# Patient Record
Sex: FEMALE | Race: WHITE | NOT HISPANIC OR LATINO | Employment: FULL TIME | URBAN - METROPOLITAN AREA
[De-identification: names, ages, dates, MRNs, and addresses within clinical notes are randomized per-mention and may not be internally consistent; named-entity substitution may affect disease eponyms.]

---

## 2020-03-25 ENCOUNTER — OFFICE VISIT (OUTPATIENT)
Dept: FAMILY MEDICINE CLINIC | Facility: CLINIC | Age: 25
End: 2020-03-25

## 2020-03-25 VITALS
BODY MASS INDEX: 20.98 KG/M2 | HEIGHT: 62 IN | OXYGEN SATURATION: 99 % | WEIGHT: 114 LBS | HEART RATE: 110 BPM | SYSTOLIC BLOOD PRESSURE: 106 MMHG | DIASTOLIC BLOOD PRESSURE: 68 MMHG

## 2020-03-25 DIAGNOSIS — F90.2 ATTENTION DEFICIT HYPERACTIVITY DISORDER (ADHD), COMBINED TYPE: Primary | ICD-10-CM

## 2020-03-25 DIAGNOSIS — F41.1 GAD (GENERALIZED ANXIETY DISORDER): ICD-10-CM

## 2020-03-25 DIAGNOSIS — L70.0 ACNE VULGARIS: ICD-10-CM

## 2020-03-25 DIAGNOSIS — F32.0 CURRENT MILD EPISODE OF MAJOR DEPRESSIVE DISORDER WITHOUT PRIOR EPISODE (HCC): ICD-10-CM

## 2020-03-25 DIAGNOSIS — Z51.81 THERAPEUTIC DRUG MONITORING: ICD-10-CM

## 2020-03-25 PROBLEM — Z79.899 CONTROLLED SUBSTANCE AGREEMENT SIGNED: Status: ACTIVE | Noted: 2020-03-25

## 2020-03-25 PROCEDURE — 99204 OFFICE O/P NEW MOD 45 MIN: CPT | Performed by: FAMILY MEDICINE

## 2020-03-25 RX ORDER — DROSPIRENONE AND ETHINYL ESTRADIOL 0.03MG-3MG
1 KIT ORAL DAILY
COMMUNITY
Start: 2020-03-17

## 2020-03-25 RX ORDER — DEXTROAMPHETAMINE SACCHARATE, AMPHETAMINE ASPARTATE MONOHYDRATE, DEXTROAMPHETAMINE SULFATE AND AMPHETAMINE SULFATE 5; 5; 5; 5 MG/1; MG/1; MG/1; MG/1
20 CAPSULE, EXTENDED RELEASE ORAL EVERY MORNING
Qty: 30 CAPSULE | Refills: 0 | Status: SHIPPED | OUTPATIENT
Start: 2020-03-25 | End: 2020-04-27 | Stop reason: SDUPTHER

## 2020-03-25 RX ORDER — CLINDAMYCIN PHOSPHATE 10 MG/G
1 GEL TOPICAL 2 TIMES DAILY
COMMUNITY
End: 2020-10-16

## 2020-03-25 RX ORDER — DEXTROAMPHETAMINE SACCHARATE, AMPHETAMINE ASPARTATE MONOHYDRATE, DEXTROAMPHETAMINE SULFATE AND AMPHETAMINE SULFATE 5; 5; 5; 5 MG/1; MG/1; MG/1; MG/1
20 CAPSULE, EXTENDED RELEASE ORAL EVERY MORNING
COMMUNITY
End: 2020-03-25 | Stop reason: SDUPTHER

## 2020-03-25 RX ORDER — HYDROXYZINE HYDROCHLORIDE 25 MG/1
12.5-5 TABLET, FILM COATED ORAL EVERY 6 HOURS PRN
Qty: 60 TABLET | Refills: 2 | Status: SHIPPED | OUTPATIENT
Start: 2020-03-25

## 2020-03-25 RX ORDER — ESCITALOPRAM OXALATE 10 MG/1
10 TABLET ORAL DAILY
Qty: 30 TABLET | Refills: 2 | Status: SHIPPED | OUTPATIENT
Start: 2020-03-25 | End: 2020-04-27 | Stop reason: SDUPTHER

## 2020-03-25 NOTE — PROGRESS NOTES
"Gabi Fernandez presents today to establish care.    Chief Complaint   Patient presents with   • Establish Care     just moved here from [place]   • Anxiety     would like to be evaluated for anxiety, states that she has panic attacks occassionally if she gets overwhelmed.   • ADD        Anxiety   Presents for initial visit. Symptoms include decreased concentration, depressed mood, excessive worry, irritability, nausea, nervous/anxious behavior and palpitations. Patient reports no chest pain, confusion, dizziness, shortness of breath or suicidal ideas. Hours of sleep per night: 5-6. The quality of sleep is non-restorative. Nighttime awakenings: several (at least 3-6 times for 20 minute intervals, occasionally 2 hour intervals).     Past treatments include counseling (CBT). The treatment provided no relief.   She has been feeling anxious for years, but just recently noticed that is not normal. It effects her that she is afraid of \"hanging out with people\" because she's scared she will say something wrong. She sits up at night and questions \"why did I say that?\" She feels like she needs to plan everything out. She worries that if she doesn't plan then she will mess something up. She has difficulty sleeping. She thought this may be related to Adderall, but even when she didn't take it she would have difficulty staying asleep. She has no issues with falling asleep. She tries to avoid social situations. She admits the social distancing related to COVID-19 has helped ease her anxiety some as this gives her an excuse not to be with other people.     She has been on Adderall for about 4-5 years. She still has Adderall medication that her mother refills then mails to the patient from [place]. She has been in [place] since August. Her anxiety has been worsened since she moved to [place], she questions if this is related to not having family here. She moved to [place] to work at [employer] as a  with intentions to get " "research lab experience then attend graduate school. Sometimes she gets overwhelmed at work with trying to balance out work tasks. She feels very anxious after she gets home from work and worries about tasks that need to be completed the next day. She likes her job but just wants to do good at it.    She has had psych therapy in the past but she doesn't like to talk about her problems much and found it non-beneficial. \"It took a lot of courage just to go to the doctor for it.\"    She is sleeping 5-6 hours each night. Wakes up 3-6 times nightly. Usually only awake for 20 mins but at times up to two hours. She has tried melatonin in the past without relief.    She has no appetite changes. She tries to eat healthier, but has no issues with eating and eats what she wants.     She denies constipation, diarrhea, vomiting. She does feel nauseated at times.    At times when she has a panic attack she feels her heart palpating. She has panic attacks around four times per month. When she has panic attacks she \"feels like something is sitting on her chest\", has difficulty breathing, and heart palpitations. She had a panic attack once on a plane due to being  from her family and party. She began questioning what if it crashed, this ultimately led to the plane being delayed. She has panic attacks sometimes after arguing with her family or boyfriend.    She does have hyperactivity, but the Adderall helps calm this down.    She is currently on Carlee and says it \"works wonderfully.\" She said this one has been the best regarding side effects. She takes the medication daily as prescribed by midwife. She has no concerns with her menses. Denies excessive bleeding. She is regular with periods, typically last 4-5 days once per month.    She uses clindamycin as needed for acne along her chin.       ISIAH-7  Over the last two weeks, how often have you been bothered by the following problems?  Feeling nervous, anxious or on edge: " 2  Not being able to stop or control worrying: 3  Worrying too much about different things: 2  Trouble Relaxing: 3  Being so restless that it is hard to sit still: 3  Becoming easily annoyed or irritable: 3  Feeling afraid as if something awful might happen: 1  ISIAH 7 Total Score: 17  If you checked any problems, how difficult have these problems made it for you to do your work, take care of things at home, or get along with other people: Somewhat difficult    PHQ-2/PHQ-9 Depression Screening 3/25/2020   Little interest or pleasure in doing things 1   Feeling down, depressed, or hopeless 1   Trouble falling or staying asleep, or sleeping too much 3   Feeling tired or having little energy 1   Poor appetite or overeating 0   Feeling bad about yourself - or that you are a failure or have let yourself or your family down 0   Trouble concentrating on things, such as reading the newspaper or watching television 0   Moving or speaking so slowly that other people could have noticed. Or the opposite - being so fidgety or restless that you have been moving around a lot more than usual 1   Thoughts that you would be better off dead, or of hurting yourself in some way 0   Total Score 7   If you checked off any problems, how difficult have these problems made it for you to do your work, take care of things at home, or get along with other people? Not difficult at all           Review of Systems   Constitutional: Positive for fatigue and irritability. Negative for appetite change, fever, unexpected weight gain and unexpected weight loss.   HENT: Negative for congestion, rhinorrhea, sneezing, sore throat and tinnitus.    Eyes: Negative for blurred vision and double vision.   Respiratory: Negative for cough, chest tightness and shortness of breath.    Cardiovascular: Positive for palpitations. Negative for chest pain.   Gastrointestinal: Positive for nausea. Negative for abdominal pain, constipation and vomiting.   Genitourinary:  "Positive for decreased libido. Negative for menstrual problem.   Musculoskeletal: Negative for arthralgias and myalgias.        Soreness in neck with increased stress   Skin: Negative for rash.   Neurological: Negative for dizziness, headache and confusion.   Psychiatric/Behavioral: Positive for agitation, decreased concentration, positive for hyperactivity, depressed mood and stress. Negative for self-injury and suicidal ideas. The patient is nervous/anxious.         Past Medical History:   Diagnosis Date   • Acne    • ADHD (attention deficit hyperactivity disorder)         Past Surgical History:   Procedure Laterality Date   • WISDOM TOOTH EXTRACTION  07/2014        History reviewed. No pertinent family history.     Social History     Socioeconomic History   • Marital status: Single     Spouse name: Not on file   • Number of children: Not on file   • Years of education: Not on file   • Highest education level: Not on file   Occupational History   • Occupation: research lab   Tobacco Use   • Smoking status: Never Smoker   • Smokeless tobacco: Never Used   Substance and Sexual Activity   • Alcohol use: Yes     Comment: occassionally   • Drug use: Never   • Sexual activity: Yes     Partners: Male     Birth control/protection: OCP        Current Outpatient Medications on File Prior to Visit   Medication Sig Dispense Refill   • amphetamine-dextroamphetamine XR (Adderall XR) 20 MG 24 hr capsule Take 20 mg by mouth Every Morning     • clindamycin (CLINDAGEL) 1 % gel Apply 1 application topically to the appropriate area as directed 2 (Two) Times a Day.     • drospirenone-ethinyl estradiol (DIGNA,OCELLA) 3-0.03 MG per tablet Take 1 tablet by mouth Daily. 200001       No current facility-administered medications on file prior to visit.        No Known Allergies     Visit Vitals  /68 (BP Location: Left arm, Patient Position: Sitting, Cuff Size: Adult)   Pulse 110   Ht 157.5 cm (62\")   Wt 51.7 kg (114 lb)   SpO2 99% "   BMI 20.85 kg/m²        Physical Exam   Constitutional: She is oriented to person, place, and time. No distress.   HENT:   Nose: Nose normal.   Mouth/Throat: Oropharynx is clear and moist.   Eyes: Pupils are equal, round, and reactive to light. Conjunctivae are normal.   Neck: Neck supple. No thyromegaly present.   Cardiovascular: Regular rhythm. Tachycardia present.   No murmur heard.  Pulses:       Posterior tibial pulses are 2+ on the right side, and 2+ on the left side.   Pulmonary/Chest: Effort normal and breath sounds normal.   Abdominal: Soft. There is no hepatosplenomegaly. There is no tenderness.   Lymphadenopathy:     She has no cervical adenopathy.   Neurological: She is alert and oriented to person, place, and time.   Skin: Skin is warm and dry. No rash noted.   Psychiatric: Her speech is normal and behavior is normal. Thought content normal. Her mood appears anxious. She is not agitated, not hyperactive and not withdrawn.   Vitals reviewed.       No results found for this or any previous visit.     Gabi was seen today for establish care, anxiety and add.    Diagnoses and all orders for this visit:    Attention deficit hyperactivity disorder (ADHD), combined type  -     amphetamine-dextroamphetamine XR (Adderall XR) 20 MG 24 hr capsule; Take 1 capsule by mouth Every Morning  Obtain prior records. Sent 1 month of Adderall. [facility]DA doesn't have information from [place].   The treatment plan includes a controlled substance.  Controlled Substance Medication Agreement signed and on file.  Risks, benefits, and alternative treatments reviewed.    UDS collected today.   ISIAH (generalized anxiety disorder)  -     hydrOXYzine (ATARAX) 25 MG tablet; Take 0.5-2 tablets by mouth Every 6 (Six) Hours As Needed for Anxiety.  -     escitalopram (Lexapro) 10 MG tablet; Take 1 tablet by mouth Daily.  New. Start SSRI.  Advised may take 4-6 weeks to notice an effect.  Discussed potential side effects including headache,  dizziness, GI symptoms, sexual side effects,  worsening mood or suicidal ideations.  Patient advised to call the office if develops any side effects or has questions. Reassess in one month.   Hydroxyzine for quick relief of panic or for sleep as needed.   Current mild episode of major depressive disorder without prior episode (CMS/HCC)  -     escitalopram (Lexapro) 10 MG tablet; Take 1 tablet by mouth Daily.  New. Start SSRI.  Advised may take 4-6 weeks to notice an effect.  Discussed potential side effects including headache, dizziness, GI symptoms, sexual side effects,  worsening mood or suicidal ideations.  Patient advised to call the office if develops any side effects or has questions. Reassess in one month.   Therapeutic drug monitoring  -     Urine Drug Screen - Urine, Clean Catch; Future    Acne vulgaris  Stable. Continue clindamycin.       Return in about 1 month (around 4/25/2020) for Office visit.    LORRIANE Farrell Student    I saw and evaluated the patient. I discussed the case with the medical student and agree with the findings and plan as documented. I personally performed the Exam and Medical Decision Making. Critical elements of the physical exam and MDM are documented above.     Dr. Valeri Olivera

## 2020-04-01 DIAGNOSIS — Z51.81 THERAPEUTIC DRUG MONITORING: ICD-10-CM

## 2020-04-27 ENCOUNTER — TELEMEDICINE (OUTPATIENT)
Dept: FAMILY MEDICINE CLINIC | Facility: CLINIC | Age: 25
End: 2020-04-27

## 2020-04-27 DIAGNOSIS — F32.0 CURRENT MILD EPISODE OF MAJOR DEPRESSIVE DISORDER WITHOUT PRIOR EPISODE (HCC): ICD-10-CM

## 2020-04-27 DIAGNOSIS — F41.1 GAD (GENERALIZED ANXIETY DISORDER): ICD-10-CM

## 2020-04-27 DIAGNOSIS — F90.2 ATTENTION DEFICIT HYPERACTIVITY DISORDER (ADHD), COMBINED TYPE: ICD-10-CM

## 2020-04-27 PROCEDURE — 99214 OFFICE O/P EST MOD 30 MIN: CPT | Performed by: FAMILY MEDICINE

## 2020-04-27 RX ORDER — ESCITALOPRAM OXALATE 20 MG/1
20 TABLET ORAL DAILY
Qty: 30 TABLET | Refills: 2 | Status: SHIPPED | OUTPATIENT
Start: 2020-04-27 | End: 2020-07-08 | Stop reason: SDUPTHER

## 2020-04-27 RX ORDER — DEXTROAMPHETAMINE SACCHARATE, AMPHETAMINE ASPARTATE MONOHYDRATE, DEXTROAMPHETAMINE SULFATE AND AMPHETAMINE SULFATE 5; 5; 5; 5 MG/1; MG/1; MG/1; MG/1
20 CAPSULE, EXTENDED RELEASE ORAL EVERY MORNING
Qty: 30 CAPSULE | Refills: 0 | Status: SHIPPED | OUTPATIENT
Start: 2020-04-27 | End: 2020-06-05 | Stop reason: SDUPTHER

## 2020-04-27 NOTE — PROGRESS NOTES
Telehealth video visit.     Chief Complaint   Patient presents with   • ADHD   • Anxiety   • Depression        Anxiety   Presents for follow-up visit. Symptoms include decreased concentration, depressed mood, dizziness, excessive worry, nausea and nervous/anxious behavior. Patient reports no suicidal ideas.     Her past medical history is significant for depression. Side effects of treatment include GI discomfort (dizziness).   Depression   Visit Type: follow-up  Patient presents with the following symptoms: anhedonia, decreased concentration, depressed mood, dizziness, excessive worry, fatigue, feelings of worthlessness, nausea and nervousness/anxiety.  Patient is not experiencing: suicidal ideas.         Felt a little more depressed. More down. A little less anxious. At first had nausea with lexapro and switched to bedtime dosing. If she lays down for awhile and stands up, she gets dizzy and sees sparkles lasting a little bit.       ISIAH-7  Over the last two weeks, how often have you been bothered by the following problems?  Feeling nervous, anxious or on edge: 1  Not being able to stop or control worryin  Worrying too much about different things: 1  Trouble Relaxin  Being so restless that it is hard to sit still: 1  Becoming easily annoyed or irritable: 3  Feeling afraid as if something awful might happen: 0  ISIAH 7 Total Score: 10      PHQ-2/PHQ-9 Depression Screening 2020   Little interest or pleasure in doing things 2   Feeling down, depressed, or hopeless 2   Trouble falling or staying asleep, or sleeping too much 2   Feeling tired or having little energy 1   Poor appetite or overeating 0   Feeling bad about yourself - or that you are a failure or have let yourself or your family down 2   Trouble concentrating on things, such as reading the newspaper or watching television 0   Moving or speaking so slowly that other people could have noticed. Or the opposite - being so fidgety or restless that you  have been moving around a lot more than usual 0   Thoughts that you would be better off dead, or of hurting yourself in some way 0   Total Score 9   If you checked off any problems, how difficult have these problems made it for you to do your work, take care of things at home, or get along with other people? -         Review of Systems   Constitutional: Positive for fatigue. Negative for appetite change.   Gastrointestinal: Positive for nausea.   Neurological: Positive for dizziness.   Psychiatric/Behavioral: Positive for decreased concentration, dysphoric mood, sleep disturbance and depressed mood. Negative for suicidal ideas. The patient is nervous/anxious.         Current Outpatient Medications on File Prior to Visit   Medication Sig Dispense Refill   • clindamycin (CLINDAGEL) 1 % gel Apply 1 application topically to the appropriate area as directed 2 (Two) Times a Day.     • drospirenone-ethinyl estradiol (DIGNA,OCELLA) 3-0.03 MG per tablet Take 1 tablet by mouth Daily. 200001     • hydrOXYzine (ATARAX) 25 MG tablet Take 0.5-2 tablets by mouth Every 6 (Six) Hours As Needed for Anxiety. 60 tablet 2   • amphetamine-dextroamphetamine XR (Adderall XR) 20 MG 24 hr capsule Take 1 capsule by mouth Every Morning 30 capsule 0   • escitalopram (Lexapro) 10 MG tablet Take 1 tablet by mouth Daily. 30 tablet 2     No current facility-administered medications on file prior to visit.        No Known Allergies    Past Medical History:   Diagnosis Date   • Acne    • ADHD (attention deficit hyperactivity disorder)         Past Surgical History:   Procedure Laterality Date   • WISDOM TOOTH EXTRACTION  07/2014        No family history on file.     Social History     Socioeconomic History   • Marital status: Single     Spouse name: Not on file   • Number of children: Not on file   • Years of education: Not on file   • Highest education level: Not on file   Occupational History   • Occupation: research lab   Tobacco Use   • Smoking  status: Never Smoker   • Smokeless tobacco: Never Used   Substance and Sexual Activity   • Alcohol use: Yes     Comment: occassionally   • Drug use: Never   • Sexual activity: Yes     Partners: Male     Birth control/protection: OCP        Physical Exam   Constitutional: She is oriented to person, place, and time. She is cooperative. She does not appear ill. No distress.   HENT:   Normal hearing   Eyes: Conjunctivae are normal. Right eye exhibits no discharge. Left eye exhibits no discharge.   Pulmonary/Chest: Effort normal. No respiratory distress.   Neurological: She is alert and oriented to person, place, and time.   Psychiatric: She has a normal mood and affect. Her behavior is normal. Judgment and thought content normal.       Gabi was seen today for adhd, anxiety and depression.    Diagnoses and all orders for this visit:    Attention deficit hyperactivity disorder (ADHD), combined type  -     amphetamine-dextroamphetamine XR (Adderall XR) 20 MG 24 hr capsule; Take 1 capsule by mouth Every Morning  Stable.  Continue Adderall.  The treatment plan includes a controlled substance.  Controlled Substance Medication Agreement signed and on file 3/20. UDS 3/25/20.  Risks, benefits, and alternative treatments reviewed.  [facility] report has been reviewed and scanned into the patient’s chart.   ISIAH (generalized anxiety disorder)  -     escitalopram (LEXAPRO) 20 MG tablet; Take 1 tablet by mouth Daily.  Improving.  Continue Lexapro.  Discussed increasing water intake to see if orthostatic dizziness improves.  Current mild episode of major depressive disorder without prior episode (CMS/HCC)  -     escitalopram (LEXAPRO) 20 MG tablet; Take 1 tablet by mouth Daily.  Uncontrolled.  Increase Lexapro to 20 mg daily.  Discussed increasing water intake to see if orthostatic dizziness improves.       Return in about 1 month (around 5/27/2020) for Office visit ADHD, anxiety, depression.    Start of visit 3:44 pm. End of visit 3:56  pm.    Dr. Valeri Olivera

## 2020-05-06 DIAGNOSIS — F90.2 ATTENTION DEFICIT HYPERACTIVITY DISORDER (ADHD), COMBINED TYPE: ICD-10-CM

## 2020-05-06 RX ORDER — DEXTROAMPHETAMINE SACCHARATE, AMPHETAMINE ASPARTATE MONOHYDRATE, DEXTROAMPHETAMINE SULFATE AND AMPHETAMINE SULFATE 5; 5; 5; 5 MG/1; MG/1; MG/1; MG/1
20 CAPSULE, EXTENDED RELEASE ORAL EVERY MORNING
Qty: 30 CAPSULE | Refills: 0 | Status: CANCELLED | OUTPATIENT
Start: 2020-05-06

## 2020-05-06 NOTE — TELEPHONE ENCOUNTER
Last fill:4/27/20  Last office visit:3/25/20  Next office visit:none  Date of last Titus:4/27/20  CSA up-to-date? 3/25/20  Date of last UDS: 3/25/20  UDS consistent:

## 2020-06-05 DIAGNOSIS — F90.2 ATTENTION DEFICIT HYPERACTIVITY DISORDER (ADHD), COMBINED TYPE: ICD-10-CM

## 2020-06-05 RX ORDER — DEXTROAMPHETAMINE SACCHARATE, AMPHETAMINE ASPARTATE MONOHYDRATE, DEXTROAMPHETAMINE SULFATE AND AMPHETAMINE SULFATE 5; 5; 5; 5 MG/1; MG/1; MG/1; MG/1
20 CAPSULE, EXTENDED RELEASE ORAL EVERY MORNING
Qty: 30 CAPSULE | Refills: 0 | Status: SHIPPED | OUTPATIENT
Start: 2020-06-05 | End: 2020-07-02 | Stop reason: SDUPTHER

## 2020-06-05 RX ORDER — DEXTROAMPHETAMINE SACCHARATE, AMPHETAMINE ASPARTATE MONOHYDRATE, DEXTROAMPHETAMINE SULFATE AND AMPHETAMINE SULFATE 5; 5; 5; 5 MG/1; MG/1; MG/1; MG/1
20 CAPSULE, EXTENDED RELEASE ORAL EVERY MORNING
Qty: 30 CAPSULE | OUTPATIENT
Start: 2020-06-05

## 2020-06-05 NOTE — TELEPHONE ENCOUNTER
Last fill: 4/27/20  Last office visit: 4/27/20  Next office visit: na  Last Titus: 3/23/20  Controlled substance contract on file? 3/25/20  Last UDS: 3/25/20

## 2020-07-02 DIAGNOSIS — F90.2 ATTENTION DEFICIT HYPERACTIVITY DISORDER (ADHD), COMBINED TYPE: ICD-10-CM

## 2020-07-06 RX ORDER — DEXTROAMPHETAMINE SACCHARATE, AMPHETAMINE ASPARTATE MONOHYDRATE, DEXTROAMPHETAMINE SULFATE AND AMPHETAMINE SULFATE 5; 5; 5; 5 MG/1; MG/1; MG/1; MG/1
20 CAPSULE, EXTENDED RELEASE ORAL EVERY MORNING
Qty: 30 CAPSULE | Refills: 0 | Status: SHIPPED | OUTPATIENT
Start: 2020-07-06 | End: 2020-08-04 | Stop reason: SDUPTHER

## 2020-07-06 NOTE — TELEPHONE ENCOUNTER
Last fill: 6/5/20  Last office visit: 4/27/20  Next office visit: 7/8/20  Date of last Titus: 4/27/20  CSA up-to-date? yes  Date of last UDS: none  UDS consistent: n/a

## 2020-07-08 ENCOUNTER — OFFICE VISIT (OUTPATIENT)
Dept: FAMILY MEDICINE CLINIC | Facility: CLINIC | Age: 25
End: 2020-07-08

## 2020-07-08 VITALS
DIASTOLIC BLOOD PRESSURE: 70 MMHG | WEIGHT: 104.4 LBS | BODY MASS INDEX: 19.21 KG/M2 | SYSTOLIC BLOOD PRESSURE: 102 MMHG | OXYGEN SATURATION: 99 % | HEIGHT: 62 IN | HEART RATE: 90 BPM

## 2020-07-08 DIAGNOSIS — R68.82 DECREASED LIBIDO: ICD-10-CM

## 2020-07-08 DIAGNOSIS — R07.9 CHEST PAIN, UNSPECIFIED TYPE: ICD-10-CM

## 2020-07-08 DIAGNOSIS — F90.2 ATTENTION DEFICIT HYPERACTIVITY DISORDER (ADHD), COMBINED TYPE: Primary | ICD-10-CM

## 2020-07-08 DIAGNOSIS — F32.0 CURRENT MILD EPISODE OF MAJOR DEPRESSIVE DISORDER WITHOUT PRIOR EPISODE (HCC): ICD-10-CM

## 2020-07-08 DIAGNOSIS — F41.1 GAD (GENERALIZED ANXIETY DISORDER): ICD-10-CM

## 2020-07-08 PROCEDURE — 99214 OFFICE O/P EST MOD 30 MIN: CPT | Performed by: FAMILY MEDICINE

## 2020-07-08 RX ORDER — BUPROPION HYDROCHLORIDE 150 MG/1
150 TABLET ORAL DAILY
Qty: 30 TABLET | Refills: 3 | Status: SHIPPED | OUTPATIENT
Start: 2020-07-08 | End: 2020-10-16 | Stop reason: SDUPTHER

## 2020-07-08 RX ORDER — ESCITALOPRAM OXALATE 20 MG/1
20 TABLET ORAL NIGHTLY
Qty: 30 TABLET | Refills: 3 | Status: SHIPPED | OUTPATIENT
Start: 2020-07-08 | End: 2020-10-16 | Stop reason: SDUPTHER

## 2020-07-08 NOTE — PROGRESS NOTES
Chief Complaint   Patient presents with   • ADHD   • Depression   • Anxiety        Depression   Visit Type: follow-up  Patient presents with the following symptoms: decreased concentration, depressed mood and palpitations.  Patient is not experiencing: nervousness/anxiety.    Anxiety   Presents for follow-up visit. Symptoms include chest pain, decreased concentration, depressed mood, dizziness and palpitations. Patient reports no nausea or nervous/anxious behavior.     Her past medical history is significant for depression.        Answers for HPI/ROS submitted by the patient on 7/2/2020   What is the primary reason for your visit?: Other  Please describe your symptoms.: Review depression and anixety medications  Have you had these symptoms before?: No  How long have you been having these symptoms?: Greater than 2 weeks    Not in a normal routine now and doesn't eat that much. When she's home, too lazy to make food and eats junk food. She doesn't eat lunch at work. Mood has been ok some days. If she wakes up in a good mood it continues. Some days wakes up and doesn't feel good all day. She gets irritated easily. Sleep has been good, wakes up a couple times here and there and falls back asleep within 15 minutes. Anxiety has gone down. Depression went up. Sex drive is decreased. Weird nail problem that they grow into themselves. Sometimes while reclined on couch she stands up and gets dizzy and spotty black intermittently. Father has tricuspid valve fusion and he has regurgitation which is genetic. Occasional chest discomfort.        Review of Systems   Cardiovascular: Positive for chest pain and palpitations.   Gastrointestinal: Negative for nausea.   Skin:        Nail problems   Neurological: Positive for dizziness and light-headedness.   Psychiatric/Behavioral: Positive for decreased concentration and depressed mood. Negative for sleep disturbance. The patient is not nervous/anxious.         Current Outpatient  "Medications   Medication Sig Dispense Refill   • amphetamine-dextroamphetamine XR (Adderall XR) 20 MG 24 hr capsule Take 1 capsule by mouth Every Morning 30 capsule 0   • clindamycin (CLINDAGEL) 1 % gel Apply 1 application topically to the appropriate area as directed 2 (Two) Times a Day.     • drospirenone-ethinyl estradiol (DIGNA,OCELLA) 3-0.03 MG per tablet Take 1 tablet by mouth Daily. 200001     • escitalopram (LEXAPRO) 20 MG tablet Take 1 tablet by mouth Daily. 30 tablet 2   • hydrOXYzine (ATARAX) 25 MG tablet Take 0.5-2 tablets by mouth Every 6 (Six) Hours As Needed for Anxiety. 60 tablet 2     No current facility-administered medications for this visit.        No Known Allergies    Past Medical History:   Diagnosis Date   • Acne    • ADHD (attention deficit hyperactivity disorder)         Past Surgical History:   Procedure Laterality Date   • WISDOM TOOTH EXTRACTION  07/2014        History reviewed. No pertinent family history.     Social History     Socioeconomic History   • Marital status: Single     Spouse name: Not on file   • Number of children: Not on file   • Years of education: Not on file   • Highest education level: Not on file   Occupational History   • Occupation: research lab   Tobacco Use   • Smoking status: Never Smoker   • Smokeless tobacco: Never Used   Substance and Sexual Activity   • Alcohol use: Yes     Comment: occassionally   • Drug use: Never   • Sexual activity: Yes     Partners: Male     Birth control/protection: OCP        Vitals:    07/08/20 1201   BP: 102/70   BP Location: Right arm   Patient Position: Sitting   Cuff Size: Adult   Pulse: 90   SpO2: 99%   Weight: 47.4 kg (104 lb 6.4 oz)   Height: 157.5 cm (62\")      Body mass index is 19.1 kg/m².    Physical Exam   Constitutional: No distress.   Cardiovascular: Normal rate and regular rhythm.   No murmur heard.  Pulmonary/Chest: Effort normal and breath sounds normal.   Psychiatric: She has a normal mood and affect. Her behavior " is normal. Judgment and thought content normal.   Vitals reviewed.      No results found for this or any previous visit.     Gabi was seen today for adhd, depression and anxiety.    Diagnoses and all orders for this visit:    Attention deficit hyperactivity disorder (ADHD), combined type  -     buPROPion XL (WELLBUTRIN XL) 150 MG 24 hr tablet; Take 1 tablet by mouth Daily.  Stable.  Continue Adderall, refill sent earlier this month.  The treatment plan includes a controlled substance.  Controlled Substance Medication Agreement signed and on file 3/25/20. UDS 3/25/20.  Risks, benefits, and alternative treatments reviewed.  [facility] report has been reviewed and scanned into the patient’s chart.  Follow-up in 3-month.  ISIAH (generalized anxiety disorder)  -     escitalopram (LEXAPRO) 20 MG tablet; Take 1 tablet by mouth Every Night.  Improved.  Continue Lexapro.  Current mild episode of major depressive disorder without prior episode (CMS/HCC)  -     escitalopram (LEXAPRO) 20 MG tablet; Take 1 tablet by mouth Every Night.  -     buPROPion XL (WELLBUTRIN XL) 150 MG 24 hr tablet; Take 1 tablet by mouth Daily.  Uncontrolled.  Augment Lexapro with Wellbutrin.  Call if any adverse effects.  Follow-up in 3 months or sooner if not improving  Decreased libido  Likely medication side effect. Discussed possibly decreasing dose of Lexapro may help but with negatively affect her mental health.  Chest pain, unspecified type  -     Adult Transthoracic Echo Limited W/ Cont if Necessary Per Protocol; Future  Patient has vague chest discomfort along with a family history of genetic heart problems.  Recommend evaluation for structural heart abnormalities with an echo.      Return in about 3 months (around 10/1/2020) for Office visit.    Dr. Valeri Olivera

## 2020-08-04 DIAGNOSIS — F90.2 ATTENTION DEFICIT HYPERACTIVITY DISORDER (ADHD), COMBINED TYPE: ICD-10-CM

## 2020-08-05 RX ORDER — DEXTROAMPHETAMINE SACCHARATE, AMPHETAMINE ASPARTATE MONOHYDRATE, DEXTROAMPHETAMINE SULFATE AND AMPHETAMINE SULFATE 5; 5; 5; 5 MG/1; MG/1; MG/1; MG/1
20 CAPSULE, EXTENDED RELEASE ORAL EVERY MORNING
Qty: 30 CAPSULE | Refills: 0 | Status: SHIPPED | OUTPATIENT
Start: 2020-08-05 | End: 2020-09-06 | Stop reason: SDUPTHER

## 2020-08-05 NOTE — TELEPHONE ENCOUNTER
Last fill: 7/6/20  Last office visit: 7/8/20  Next office visit: 10/16/20  Date of last Titus: 7/8/20  CSA up-to-date? 3/25/20  Date of last UDS: 3/25/20  UDS consistent: consistent

## 2020-08-06 ENCOUNTER — HOSPITAL ENCOUNTER (OUTPATIENT)
Dept: CARDIOLOGY | Facility: HOSPITAL | Age: 25
Discharge: HOME | End: 2020-08-06
Admitting: FAMILY MEDICINE

## 2020-08-06 VITALS — WEIGHT: 104 LBS | BODY MASS INDEX: 19.14 KG/M2 | HEIGHT: 62 IN

## 2020-08-06 DIAGNOSIS — R07.9 CHEST PAIN, UNSPECIFIED TYPE: ICD-10-CM

## 2020-08-06 LAB
BH CV ECHO MEAS - AO ROOT AREA (BSA CORRECTED): 1.7
BH CV ECHO MEAS - AO ROOT AREA: 4.5 CM^2
BH CV ECHO MEAS - AO ROOT DIAM: 2.4 CM
BH CV ECHO MEAS - ASC AORTA: 2.3 CM
BH CV ECHO MEAS - BSA(HAYCOCK): 1.4 M^2
BH CV ECHO MEAS - BSA: 1.5 M^2
BH CV ECHO MEAS - BZI_BMI: 19.2 KILOGRAMS/M^2
BH CV ECHO MEAS - BZI_METRIC_HEIGHT: 157.5 CM
BH CV ECHO MEAS - BZI_METRIC_WEIGHT: 47.6 KG
BH CV ECHO MEAS - EDV(CUBED): 81.7 ML
BH CV ECHO MEAS - EDV(MOD-SP2): 51 ML
BH CV ECHO MEAS - EDV(MOD-SP4): 43 ML
BH CV ECHO MEAS - EDV(TEICH): 84.9 ML
BH CV ECHO MEAS - EF(CUBED): 69.9 %
BH CV ECHO MEAS - EF(MOD-BP): 60 %
BH CV ECHO MEAS - EF(MOD-SP2): 58.8 %
BH CV ECHO MEAS - EF(MOD-SP4): 60.5 %
BH CV ECHO MEAS - EF(TEICH): 61.7 %
BH CV ECHO MEAS - ESV(CUBED): 24.6 ML
BH CV ECHO MEAS - ESV(MOD-SP2): 21 ML
BH CV ECHO MEAS - ESV(MOD-SP4): 17 ML
BH CV ECHO MEAS - ESV(TEICH): 32.5 ML
BH CV ECHO MEAS - FS: 32.9 %
BH CV ECHO MEAS - IVS/LVPW: 1.1
BH CV ECHO MEAS - IVSD: 0.75 CM
BH CV ECHO MEAS - LA DIMENSION: 2.6 CM
BH CV ECHO MEAS - LA/AO: 1.1
BH CV ECHO MEAS - LV DIASTOLIC VOL/BSA (35-75): 29.6 ML/M^2
BH CV ECHO MEAS - LV MASS(C)D: 92.2 GRAMS
BH CV ECHO MEAS - LV MASS(C)DI: 63.5 GRAMS/M^2
BH CV ECHO MEAS - LV SYSTOLIC VOL/BSA (12-30): 11.7 ML/M^2
BH CV ECHO MEAS - LVIDD: 4.3 CM
BH CV ECHO MEAS - LVIDS: 2.9 CM
BH CV ECHO MEAS - LVLD AP2: 6.8 CM
BH CV ECHO MEAS - LVLD AP4: 7 CM
BH CV ECHO MEAS - LVLS AP2: 6.4 CM
BH CV ECHO MEAS - LVLS AP4: 6.3 CM
BH CV ECHO MEAS - LVPWD: 0.68 CM
BH CV ECHO MEAS - MV A MAX VEL: 51.8 CM/SEC
BH CV ECHO MEAS - MV DEC TIME: 0.16 SEC
BH CV ECHO MEAS - MV E MAX VEL: 76.5 CM/SEC
BH CV ECHO MEAS - MV E/A: 1.5
BH CV ECHO MEAS - PA ACC SLOPE: 649 CM/SEC^2
BH CV ECHO MEAS - PA ACC TIME: 0.14 SEC
BH CV ECHO MEAS - PA PR(ACCEL): 17.8 MMHG
BH CV ECHO MEAS - SI(CUBED): 39.3 ML/M^2
BH CV ECHO MEAS - SI(MOD-SP2): 20.6 ML/M^2
BH CV ECHO MEAS - SI(MOD-SP4): 17.9 ML/M^2
BH CV ECHO MEAS - SI(TEICH): 36.1 ML/M^2
BH CV ECHO MEAS - SV(CUBED): 57.1 ML
BH CV ECHO MEAS - SV(MOD-SP2): 30 ML
BH CV ECHO MEAS - SV(MOD-SP4): 26 ML
BH CV ECHO MEAS - SV(TEICH): 52.4 ML
BH CV ECHO MEAS - TAPSE (>1.6): 2 CM2
BH CV VAS BP RIGHT ARM: NORMAL MMHG
BH CV XLRA - RV BASE: 2.1 CM
BH CV XLRA - RV LENGTH: 4.9 CM
BH CV XLRA - RV MID: 1.6 CM
LV EF 2D ECHO EST: 60 %
MAXIMAL PREDICTED HEART RATE: 197 BPM
STRESS TARGET HR: 167 BPM

## 2020-08-06 PROCEDURE — 93308 TTE F-UP OR LMTD: CPT

## 2020-08-06 PROCEDURE — 93325 DOPPLER ECHO COLOR FLOW MAPG: CPT

## 2020-08-06 PROCEDURE — 93325 DOPPLER ECHO COLOR FLOW MAPG: CPT | Performed by: INTERNAL MEDICINE

## 2020-08-06 PROCEDURE — 93321 DOPPLER ECHO F-UP/LMTD STD: CPT | Performed by: INTERNAL MEDICINE

## 2020-08-06 PROCEDURE — 93321 DOPPLER ECHO F-UP/LMTD STD: CPT

## 2020-08-06 PROCEDURE — 93308 TTE F-UP OR LMTD: CPT | Performed by: INTERNAL MEDICINE

## 2020-09-06 DIAGNOSIS — F90.2 ATTENTION DEFICIT HYPERACTIVITY DISORDER (ADHD), COMBINED TYPE: ICD-10-CM

## 2020-09-08 RX ORDER — DEXTROAMPHETAMINE SACCHARATE, AMPHETAMINE ASPARTATE MONOHYDRATE, DEXTROAMPHETAMINE SULFATE AND AMPHETAMINE SULFATE 5; 5; 5; 5 MG/1; MG/1; MG/1; MG/1
20 CAPSULE, EXTENDED RELEASE ORAL EVERY MORNING
Qty: 30 CAPSULE | Refills: 0 | Status: SHIPPED | OUTPATIENT
Start: 2020-09-08 | End: 2020-10-07 | Stop reason: SDUPTHER

## 2020-09-08 NOTE — TELEPHONE ENCOUNTER
Last fill: 8/5/20  Last office visit: 7/8/20  Next office visit: 10/16/20  Date of last Titus: 7/8/20  CSA up-to-date? 3/25/20  Date of last UDS: N/A  UDS consistent: N/A

## 2020-10-07 DIAGNOSIS — F90.2 ATTENTION DEFICIT HYPERACTIVITY DISORDER (ADHD), COMBINED TYPE: ICD-10-CM

## 2020-10-07 RX ORDER — DEXTROAMPHETAMINE SACCHARATE, AMPHETAMINE ASPARTATE MONOHYDRATE, DEXTROAMPHETAMINE SULFATE AND AMPHETAMINE SULFATE 5; 5; 5; 5 MG/1; MG/1; MG/1; MG/1
20 CAPSULE, EXTENDED RELEASE ORAL EVERY MORNING
Qty: 30 CAPSULE | Refills: 0 | Status: SHIPPED | OUTPATIENT
Start: 2020-10-07 | End: 2020-11-05 | Stop reason: SDUPTHER

## 2020-10-07 NOTE — TELEPHONE ENCOUNTER
Last fill: 9/8/20  Last office visit: 7/8/20  Next office visit: 10/16/20  CSA up-to-date? 3/25/20  Date of last UDS: 3/25/20  UDS consistent: consistent

## 2020-10-16 ENCOUNTER — OFFICE VISIT (OUTPATIENT)
Dept: FAMILY MEDICINE CLINIC | Facility: CLINIC | Age: 25
End: 2020-10-16

## 2020-10-16 ENCOUNTER — LAB (OUTPATIENT)
Dept: LAB | Facility: HOSPITAL | Age: 25
End: 2020-10-16

## 2020-10-16 VITALS
DIASTOLIC BLOOD PRESSURE: 72 MMHG | OXYGEN SATURATION: 98 % | BODY MASS INDEX: 19.06 KG/M2 | WEIGHT: 103.6 LBS | HEART RATE: 94 BPM | SYSTOLIC BLOOD PRESSURE: 110 MMHG | HEIGHT: 62 IN

## 2020-10-16 DIAGNOSIS — R63.4 WEIGHT LOSS: ICD-10-CM

## 2020-10-16 DIAGNOSIS — F32.0 CURRENT MILD EPISODE OF MAJOR DEPRESSIVE DISORDER WITHOUT PRIOR EPISODE (HCC): ICD-10-CM

## 2020-10-16 DIAGNOSIS — K59.00 CONSTIPATION, UNSPECIFIED CONSTIPATION TYPE: ICD-10-CM

## 2020-10-16 DIAGNOSIS — F90.2 ATTENTION DEFICIT HYPERACTIVITY DISORDER (ADHD), COMBINED TYPE: Primary | ICD-10-CM

## 2020-10-16 DIAGNOSIS — F41.1 GAD (GENERALIZED ANXIETY DISORDER): ICD-10-CM

## 2020-10-16 LAB — TSH SERPL DL<=0.05 MIU/L-ACNC: 0.55 UIU/ML (ref 0.27–4.2)

## 2020-10-16 PROCEDURE — 99214 OFFICE O/P EST MOD 30 MIN: CPT | Performed by: FAMILY MEDICINE

## 2020-10-16 PROCEDURE — 84443 ASSAY THYROID STIM HORMONE: CPT

## 2020-10-16 RX ORDER — BUPROPION HYDROCHLORIDE 150 MG/1
150 TABLET ORAL DAILY
Qty: 30 TABLET | Refills: 5 | Status: SHIPPED | OUTPATIENT
Start: 2020-10-16 | End: 2021-01-18

## 2020-10-16 RX ORDER — ESCITALOPRAM OXALATE 20 MG/1
20 TABLET ORAL NIGHTLY
Qty: 30 TABLET | Refills: 5 | Status: SHIPPED | OUTPATIENT
Start: 2020-10-16 | End: 2020-12-17 | Stop reason: SDUPTHER

## 2020-10-16 NOTE — PROGRESS NOTES
Chief Complaint   Patient presents with   • ADHD        HPI     Answers for HPI/ROS submitted by the patient on 10/13/2020   What is the primary reason for your visit?: Other  Please describe your symptoms.: 3 month controlled substance check up  Have you had these symptoms before?: Yes  How long have you been having these symptoms?: Greater than 2 weeks  Please list any medications you are currently taking for this condition.: Adderall XR    She used to work out every day. During quarantine she stopped working out and lost muscle weight. She has normal appetite. She has something small for breakfast. Has lunch and dinner plus snacking. 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 BM every 2-3 days. Tries to drink a decent amount of water, 32 oz water. She eats a lot of fruit and oatmeal. She has spinach, lettuce, mushrooms, and carrots.     Anxiety is nothing now since increasing Lexapro. She did public speaking a couple weeks ago and didn't hyperventilate, sweating, upset stomach or dying. She has hydroxyzine at home but hasn't needed it. About once every 2 weeks.     Adderall helping ADHD.         Review of Systems   Constitutional: Negative for appetite change, unexpected weight gain and unexpected weight loss.   Cardiovascular: Negative for palpitations.   Gastrointestinal: Positive for constipation. Negative for abdominal distention, abdominal pain and diarrhea.   Endocrine: Positive for cold intolerance. Negative for heat intolerance.   Genitourinary: Negative for menstrual problem.   Musculoskeletal: Positive for neck pain. Negative for arthralgias and myalgias.   Neurological: Positive for tremors.   Psychiatric/Behavioral: Positive for decreased concentration. Negative for depressed mood. The patient is not nervous/anxious.         Patient Active Problem List   Diagnosis   • Attention deficit hyperactivity disorder (ADHD), combined type   • ISIAH (generalized anxiety disorder)   •  "Current mild episode of major depressive disorder without prior episode (CMS/[facility])   • Acne vulgaris   • Controlled substance agreement signed       Current Outpatient Medications   Medication Sig Dispense Refill   • amphetamine-dextroamphetamine XR (Adderall XR) 20 MG 24 hr capsule Take 1 capsule by mouth Every Morning 30 capsule 0   • buPROPion XL (WELLBUTRIN XL) 150 MG 24 hr tablet Take 1 tablet by mouth Daily. 30 tablet 3   • drospirenone-ethinyl estradiol (DIGNA,OCELLA) 3-0.03 MG per tablet Take 1 tablet by mouth Daily. 200001     • escitalopram (LEXAPRO) 20 MG tablet Take 1 tablet by mouth Every Night. 30 tablet 3   • hydrOXYzine (ATARAX) 25 MG tablet Take 0.5-2 tablets by mouth Every 6 (Six) Hours As Needed for Anxiety. 60 tablet 2     No current facility-administered medications for this visit.        No Known Allergies    Past Medical History:   Diagnosis Date   • Acne    • ADHD (attention deficit hyperactivity disorder)         Past Surgical History:   Procedure Laterality Date   • WISDOM TOOTH EXTRACTION  07/2014        History reviewed. No pertinent family history.     Social History     Socioeconomic History   • Marital status: Single     Spouse name: Not on file   • Number of children: Not on file   • Years of education: Not on file   • Highest education level: Not on file   Occupational History   • Occupation: research lab   Tobacco Use   • Smoking status: Never Smoker   • Smokeless tobacco: Never Used   Substance and Sexual Activity   • Alcohol use: Yes     Comment: occassionally   • Drug use: Never   • Sexual activity: Yes     Partners: Male     Birth control/protection: OCP        Vitals:    10/16/20 1358   BP: 110/72   BP Location: Left arm   Patient Position: Sitting   Cuff Size: Adult   Pulse: 94   SpO2: 98%   Weight: 47 kg (103 lb 9.6 oz)   Height: 157 cm (61.81\")      Body mass index is 19.07 kg/m².    Physical Exam  Vitals signs reviewed.   Constitutional:       General: She is not in acute " distress.     Appearance: She is not ill-appearing.   Neck:      Thyroid: No thyromegaly.   Cardiovascular:      Rate and Rhythm: Normal rate and regular rhythm.      Heart sounds: No murmur.   Pulmonary:      Effort: Pulmonary effort is normal.      Breath sounds: Normal breath sounds.   Abdominal:      General: There is no distension.      Palpations: Abdomen is soft.      Tenderness: There is no abdominal tenderness. There is no guarding or rebound.   Neurological:      Mental Status: She is alert.   Psychiatric:         Mood and Affect: Mood normal.         Behavior: Behavior normal.         Thought Content: Thought content normal.         Judgment: Judgment normal.              Diagnoses and all orders for this visit:    1. Attention deficit hyperactivity disorder (ADHD), combined type (Primary)  -     buPROPion XL (WELLBUTRIN XL) 150 MG 24 hr tablet; Take 1 tablet by mouth Daily.  Dispense: 30 tablet; Refill: 5  The treatment plan includes a controlled substance.  Controlled Substance Medication Agreement signed and on file 3/25/2020. UDS 3/25/2020.  Risks, benefits, and alternative treatments reviewed.  RHONDA report has been reviewed.   Continue Adderall 20 mg but she did not need a refill today.  Continue bupropion.  2. ISIAH (generalized anxiety disorder)  -     escitalopram (LEXAPRO) 20 MG tablet; Take 1 tablet by mouth Every Night.  Dispense: 30 tablet; Refill: 5  Improved.  Continue Lexapro.  3. Current mild episode of major depressive disorder without prior episode (CMS/HCC)  -     escitalopram (LEXAPRO) 20 MG tablet; Take 1 tablet by mouth Every Night.  Dispense: 30 tablet; Refill: 5  -     buPROPion XL (WELLBUTRIN XL) 150 MG 24 hr tablet; Take 1 tablet by mouth Daily.  Dispense: 30 tablet; Refill: 5  Improved.  Continue Lexapro and bupropion.  4. Weight loss  -     TSH Rfx On Abnormal To Free T4; Future  He has had difficulty regaining weight since he had lost.  At this time her weight is down an  additional pound.  Check for thyroid dysfunction.  5. Constipation, unspecified constipation type  New.  Recommend increasing water and fiber both in dietary or supplement form.  If not improving would consider stool softeners.      Return in about 3 months (around 1/16/2021) for Office visit ADHD.    Dr. Valeri Olivera

## 2020-11-05 DIAGNOSIS — F90.2 ATTENTION DEFICIT HYPERACTIVITY DISORDER (ADHD), COMBINED TYPE: ICD-10-CM

## 2020-11-05 RX ORDER — DEXTROAMPHETAMINE SACCHARATE, AMPHETAMINE ASPARTATE MONOHYDRATE, DEXTROAMPHETAMINE SULFATE AND AMPHETAMINE SULFATE 5; 5; 5; 5 MG/1; MG/1; MG/1; MG/1
20 CAPSULE, EXTENDED RELEASE ORAL EVERY MORNING
Qty: 30 CAPSULE | Refills: 0 | Status: SHIPPED | OUTPATIENT
Start: 2020-11-05 | End: 2020-12-03 | Stop reason: SDUPTHER

## 2020-12-03 DIAGNOSIS — F90.2 ATTENTION DEFICIT HYPERACTIVITY DISORDER (ADHD), COMBINED TYPE: ICD-10-CM

## 2020-12-03 RX ORDER — DEXTROAMPHETAMINE SACCHARATE, AMPHETAMINE ASPARTATE MONOHYDRATE, DEXTROAMPHETAMINE SULFATE AND AMPHETAMINE SULFATE 5; 5; 5; 5 MG/1; MG/1; MG/1; MG/1
20 CAPSULE, EXTENDED RELEASE ORAL EVERY MORNING
Qty: 30 CAPSULE | Refills: 0 | Status: SHIPPED | OUTPATIENT
Start: 2020-12-03 | End: 2021-01-05 | Stop reason: SDUPTHER

## 2020-12-03 NOTE — TELEPHONE ENCOUNTER
Last fill:11/5/20  Last office visit:10/16/20  Next office visit:1/18/21  CSA up-to-date? 3/25/20  Date of last UDS: 3/25/20  UDS consistent:

## 2020-12-17 DIAGNOSIS — F32.0 CURRENT MILD EPISODE OF MAJOR DEPRESSIVE DISORDER WITHOUT PRIOR EPISODE (HCC): ICD-10-CM

## 2020-12-17 DIAGNOSIS — F41.1 GAD (GENERALIZED ANXIETY DISORDER): ICD-10-CM

## 2020-12-18 RX ORDER — ESCITALOPRAM OXALATE 20 MG/1
20 TABLET ORAL NIGHTLY
Qty: 30 TABLET | Refills: 0 | Status: SHIPPED | OUTPATIENT
Start: 2020-12-18 | End: 2021-01-18 | Stop reason: SDUPTHER

## 2021-01-05 DIAGNOSIS — F90.2 ATTENTION DEFICIT HYPERACTIVITY DISORDER (ADHD), COMBINED TYPE: ICD-10-CM

## 2021-01-06 RX ORDER — DEXTROAMPHETAMINE SACCHARATE, AMPHETAMINE ASPARTATE MONOHYDRATE, DEXTROAMPHETAMINE SULFATE AND AMPHETAMINE SULFATE 5; 5; 5; 5 MG/1; MG/1; MG/1; MG/1
20 CAPSULE, EXTENDED RELEASE ORAL EVERY MORNING
Qty: 30 CAPSULE | Refills: 0 | Status: SHIPPED | OUTPATIENT
Start: 2021-01-06 | End: 2021-01-18 | Stop reason: SDUPTHER

## 2021-01-06 NOTE — TELEPHONE ENCOUNTER
Last refill 12/03/2020  LOV10/16/2020                                                                   NOV01/18/2021

## 2021-01-18 ENCOUNTER — OFFICE VISIT (OUTPATIENT)
Dept: FAMILY MEDICINE CLINIC | Facility: CLINIC | Age: 26
End: 2021-01-18

## 2021-01-18 VITALS
HEIGHT: 62 IN | OXYGEN SATURATION: 98 % | WEIGHT: 102.2 LBS | DIASTOLIC BLOOD PRESSURE: 78 MMHG | SYSTOLIC BLOOD PRESSURE: 112 MMHG | HEART RATE: 90 BPM | BODY MASS INDEX: 18.81 KG/M2

## 2021-01-18 DIAGNOSIS — F32.0 CURRENT MILD EPISODE OF MAJOR DEPRESSIVE DISORDER WITHOUT PRIOR EPISODE (HCC): ICD-10-CM

## 2021-01-18 DIAGNOSIS — Z51.81 THERAPEUTIC DRUG MONITORING: ICD-10-CM

## 2021-01-18 DIAGNOSIS — F90.2 ATTENTION DEFICIT HYPERACTIVITY DISORDER (ADHD), COMBINED TYPE: Primary | ICD-10-CM

## 2021-01-18 DIAGNOSIS — F41.1 GAD (GENERALIZED ANXIETY DISORDER): ICD-10-CM

## 2021-01-18 PROCEDURE — 99214 OFFICE O/P EST MOD 30 MIN: CPT | Performed by: FAMILY MEDICINE

## 2021-01-18 RX ORDER — ESCITALOPRAM OXALATE 20 MG/1
20 TABLET ORAL NIGHTLY
Qty: 30 TABLET | Refills: 5 | Status: SHIPPED | OUTPATIENT
Start: 2021-01-18

## 2021-01-18 RX ORDER — DEXTROAMPHETAMINE SACCHARATE, AMPHETAMINE ASPARTATE MONOHYDRATE, DEXTROAMPHETAMINE SULFATE AND AMPHETAMINE SULFATE 6.25; 6.25; 6.25; 6.25 MG/1; MG/1; MG/1; MG/1
25 CAPSULE, EXTENDED RELEASE ORAL EVERY MORNING
Qty: 30 CAPSULE | Refills: 0 | Status: SHIPPED | OUTPATIENT
Start: 2021-01-18 | End: 2021-01-26

## 2021-01-18 NOTE — PROGRESS NOTES
"Chief Complaint  ADHD    Subjective          Gabi Fernandez presents to [facility] for   History of Present Illness     Answers for HPI/ROS submitted by the patient on 1/16/2021   What is the primary reason for your visit?: Other  Please describe your symptoms.: Controlled substance check up  Have you had these symptoms before?: Yes  How long have you been having these symptoms?: Greater than 2 weeks      Some days Adderall helpful, other days not. She feels motivated all day and gets everything done on some days. Other days after work while studying later at night harder to keep focused.     She stopped taking bupropion because she was shaky. 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 Lexapro working well for anxiety and depression.      Objective   Vital Signs:   /78 (BP Location: Left arm, Patient Position: Sitting, Cuff Size: Adult)   Pulse 90   Ht 157 cm (61.81\")   Wt 46.4 kg (102 lb 3.2 oz)   SpO2 98%   BMI 18.81 kg/m²     Physical Exam  Vitals signs reviewed.   Constitutional:       General: She is not in acute distress.     Appearance: She is not ill-appearing.   Cardiovascular:      Rate and Rhythm: Normal rate and regular rhythm.      Heart sounds: No murmur.   Pulmonary:      Effort: Pulmonary effort is normal.      Breath sounds: Normal breath sounds.   Neurological:      Mental Status: She is alert.        Result Review :            Urine Drug Screen - Urine, Clean Catch (03/25/2020)       Assessment and Plan    Problem List Items Addressed This Visit        Mental Health    Attention deficit hyperactivity disorder (ADHD), combined type - Primary    Relevant Medications    amphetamine-dextroamphetamine XR (ADDERALL XR) 25 MG 24 hr capsule    escitalopram (LEXAPRO) 20 MG tablet    ISIAH (generalized anxiety disorder)    Relevant Medications    amphetamine-dextroamphetamine XR (ADDERALL XR) 25 MG 24 hr capsule    escitalopram (LEXAPRO) 20 MG tablet    Current " mild episode of major depressive disorder without prior episode (CMS/HCC)    Relevant Medications    amphetamine-dextroamphetamine XR (ADDERALL XR) 25 MG 24 hr capsule    escitalopram (LEXAPRO) 20 MG tablet      Other Visit Diagnoses     Therapeutic drug monitoring        Relevant Orders    Urine Drug Screen - Urine, Clean Catch      The treatment plan includes a controlled substance.  Controlled Substance Medication Agreement signed and on file 3/25/2020.  Reviewed UDS 3/25/2020.  Updated CSA and UDS today. Risks, benefits, and alternative treatments reviewed.  RHONDA report has been reviewed.  At this time will increase Adderall to 25 mg.  Monitor for side effects.  Continue Lexapro for anxiety and depression.  Patient experienced shaking side effects with bupropion and has since discontinued.          Follow Up   Return in about 3 months (around 4/18/2021) for Office visit ADHD.  Patient was given instructions and counseling regarding her condition or for health maintenance advice. Please see specific information pulled into the AVS if appropriate.     Electronically signed by Valeri Olivera MD, 01/18/21, 4:27 PM EST.

## 2021-01-26 ENCOUNTER — TELEPHONE (OUTPATIENT)
Dept: FAMILY MEDICINE CLINIC | Facility: CLINIC | Age: 26
End: 2021-01-26

## 2021-01-26 DIAGNOSIS — F32.0 CURRENT MILD EPISODE OF MAJOR DEPRESSIVE DISORDER WITHOUT PRIOR EPISODE (HCC): ICD-10-CM

## 2021-01-26 DIAGNOSIS — F90.2 ATTENTION DEFICIT HYPERACTIVITY DISORDER (ADHD), COMBINED TYPE: Primary | ICD-10-CM

## 2021-01-26 DIAGNOSIS — F41.1 GAD (GENERALIZED ANXIETY DISORDER): ICD-10-CM

## 2021-01-26 DIAGNOSIS — Z51.81 THERAPEUTIC DRUG MONITORING: ICD-10-CM

## 2021-01-26 PROBLEM — R82.5 POSITIVE URINE DRUG SCREEN: Status: ACTIVE | Noted: 2021-01-26

## 2021-01-26 PROBLEM — Z91.14 CONTROLLED SUBSTANCE AGREEMENT BROKEN: Status: ACTIVE | Noted: 2021-01-26

## 2021-01-26 NOTE — TELEPHONE ENCOUNTER
Urine drug screening collected on January 18, 2021 is abnormal.  It is positive for THC.  This is a violation of your controlled substance agreement and I will no longer prescribe Adderall or any other controlled prescriptions.  I will place a referral to psychiatry to take over management of your ADHD.

## 2022-05-02 ENCOUNTER — TRANSCRIBE ORDERS (OUTPATIENT)
Dept: LAB | Facility: HOSPITAL | Age: 27
End: 2022-05-02

## 2022-05-02 ENCOUNTER — LAB (OUTPATIENT)
Dept: LAB | Facility: HOSPITAL | Age: 27
End: 2022-05-02

## 2022-05-02 DIAGNOSIS — Z11.59 SCREENING EXAMINATION FOR POLIOMYELITIS: Primary | ICD-10-CM

## 2022-05-02 PROCEDURE — 86480 TB TEST CELL IMMUN MEASURE: CPT

## 2022-05-02 PROCEDURE — 36415 COLL VENOUS BLD VENIPUNCTURE: CPT

## 2022-05-04 LAB
GAMMA INTERFERON BACKGROUND BLD IA-ACNC: 0.03 IU/ML
M TB IFN-G BLD-IMP: NEGATIVE
M TB IFN-G CD4+ BCKGRND COR BLD-ACNC: 0.03 IU/ML
M TB IFN-G CD4+CD8+ BCKGRND COR BLD-ACNC: 0.03 IU/ML
MITOGEN IGNF BLD-ACNC: >10 IU/ML
SERVICE CMNT-IMP: NORMAL